# Patient Record
(demographics unavailable — no encounter records)

---

## 2024-10-29 NOTE — END OF VISIT
[FreeTextEntry3] :   I, Colleen Arias, acted as a scribe on behalf of Dr. Shira Welch MD on 10/28/2024.   All medical entries made by the scribe were at my, Dr. Shira Welch MD direction and personally dictated by me on 10/28/2024. I have reviewed the chart and agree that the record accurately reflects my personal performance of the history, physical exam, assessment and plan. I have also personally directed, reviewed, and agreed with the chart.

## 2024-10-29 NOTE — HISTORY OF PRESENT ILLNESS
[FreeTextEntry1] :  35 year  old female presents for a Post partum visit , baby boy weighing 7.12oz  delivered by  section on 2024 . Del by Dr Root.     Delivery details: baby boy weighing 7.12oz  delivered by  section on 2024 . Del by Dr Root.   Delivery complications: none  Current symptoms and complaints: normal bladder function, normal bowel function, no PPD sx. no acute complaints. enjoying her son.  Infant feeding:  - breast and bottle-feeding , combo , denies mastitis   Physical Exam: Breast: -Normal bilateral exam Abdomen/pelvic -Incision well healed, C/D/I -No abdominal pain or tenderness -perineum; normal, BME wnl, no CMT  L/E: -Warm and well perfused  Assessment:  -s/p CD, doing well   Plan:  -Inter pregnancy interval discussed -contraception options discussed -mastitis precautions reviewed  -post partum clearance -s/p lovenox -RTO 3-4 months for annual or prn

## 2025-01-02 NOTE — HISTORY OF PRESENT ILLNESS
[FreeTextEntry1] : 35 y/o  LMP 2024 presents for follow up visit for lower pelvic cramping for 2 weeks after her period on 24. Not feeling pain since, reviewed normal pelvic sonogram from 12/3/0/24. Last month was her first menses since delivery, no fever/chill/abdominal pain/abnormal bleeding or discharge. Not sexually active since delivery.    2024  ALL : ibuprofen

## 2025-01-02 NOTE — PLAN
[FreeTextEntry1] : Discomfort near  site during menses -discussed red flag symptoms of infection/bleeding -reviewed normal pelvic sonogram 24 -discussed abdominal binder for support -incision healing well, uterus assessed. nl PP position, no tenderness on exam.   rto for annual

## 2025-01-02 NOTE — COUNSELING
[Nutrition/ Exercise/ Weight Management] : nutrition, exercise, weight management [Vitamins/Supplements] : vitamins/supplements [Breast Self Exam] : breast self exam [Contraception/ Emergency Contraception/ Safe Sexual Practices] : contraception, emergency contraception, safe sexual practices [Confidentiality] : confidentiality [STD (testing, results, tx)] : STD (testing, results, tx) [Pre/Post Op Instructions] : pre/post op instructions

## 2025-01-02 NOTE — PHYSICAL EXAM
[Chaperone Declined] : Patient declined chaperone [Appropriately responsive] : appropriately responsive [Alert] : alert [No Acute Distress] : no acute distress [No Lymphadenopathy] : no lymphadenopathy [Soft] : soft [Non-tender] : non-tender [Non-distended] : non-distended [No HSM] : No HSM [No Lesions] : no lesions [No Mass] : no mass [Oriented x3] : oriented x3